# Patient Record
Sex: MALE | Race: OTHER | URBAN - METROPOLITAN AREA
[De-identification: names, ages, dates, MRNs, and addresses within clinical notes are randomized per-mention and may not be internally consistent; named-entity substitution may affect disease eponyms.]

---

## 2023-11-09 ENCOUNTER — EMERGENCY (EMERGENCY)
Facility: HOSPITAL | Age: 28
LOS: 1 days | Discharge: HOME | End: 2023-11-09
Admitting: EMERGENCY MEDICINE
Payer: COMMERCIAL

## 2023-11-09 VITALS
HEART RATE: 60 BPM | RESPIRATION RATE: 17 BRPM | SYSTOLIC BLOOD PRESSURE: 119 MMHG | OXYGEN SATURATION: 99 % | DIASTOLIC BLOOD PRESSURE: 74 MMHG | TEMPERATURE: 99 F

## 2023-11-09 PROCEDURE — 99283 EMERGENCY DEPT VISIT LOW MDM: CPT

## 2023-11-09 NOTE — ED PROVIDER NOTE - PATIENT PORTAL LINK FT
You can access the FollowMyHealth Patient Portal offered by [facility] by registering at the following website: [email or web address]. By joining [employer]’s FollowMyHealth portal, you will also be able to view your health information using other applications (apps) compatible with our system.

## 2023-11-09 NOTE — ED ADULT NURSE NOTE - PATIENT/CAREGIVER OFFERED  INTERPRETER SERVICES
[id number] PAUL DAVILA, ATTENDING NOTE:      GEN: Patient is awake and alert and in no acute distress.  Non-verbal at exam.  HEENT: Normocephalic/atraumatic.  Airway patent.  No oropharyngeal edema.  No stridor.  Auricles are normal.  Neck supple. Vertical surgical scar over the skull base and cervical spine.  Sclera are non-icteric/Conjunctiva are not injected.   RESP: Lungs CTAB, no wheeze, no rhonchi,  no rales.    CV: Heart is regular rate and rhythm.    ABD: Abdomen is soft, not distended +BS.  Non-tender to palpation.  No rebound/no guarding.  MSK: Back is nontender, no CVAT.   NEURO: Does not follow commands.   PSYCH: Flat affect.  No apparent risk to self or others.

## 2023-11-09 NOTE — ED PROVIDER NOTE - PHYSICAL EXAMINATION
tooth #4 avulsed to the gum line, + cares, + surrounding edema and erythema that extend to the hard palate.

## 2023-11-09 NOTE — ED PROVIDER NOTE - CARE PROVIDER_API CALL
[facility],   [facility] is located at [facility] [address] [place] [address] ([facility]) in [place].    Urgent care for patients pain, excessive bleeding, swelling, oral infection and/or trauma is provided with no appointment necessary, on a first-come, first-served basis, during the following times:    Monday – Thursday: 8:30 am – 4:00 pm  Friday, 8:30 am – 3:00 pm  Phone: (   )    -  Fax: (   )    -  Follow Up Time: Urgent

## 2023-11-09 NOTE — ED PROVIDER NOTE - PROVIDER TOKENS
FREE:[LAST:[[facility]],PHONE:[(   )    -],FAX:[(   )    -],ADDRESS:[[facility] is located at [facility] [address] [place] [address] ([facility]) in [place].    Urgent care for patients pain, excessive bleeding, swelling, oral infection and/or trauma is provided with no appointment necessary, on a first-come, first-served basis, during the following times:    Monday – Thursday: 8:30 am – 4:00 pm  Friday, 8:30 am – 3:00 pm],FOLLOWUP:[Urgent]]

## 2023-11-09 NOTE — ED PROVIDER NOTE - OBJECTIVE STATEMENT
Patient presents with tooth pain x 2 weeks with worsening pain and swelling over the past 3-4 days. denies fever, chills, nightsweats. state that he had broken his tooth a few months ago, did not have time to seek medical attention at the time, recently relocated to [place] and does not have a dentist.

## 2023-11-09 NOTE — ED PROVIDER NOTE - CLINICAL SUMMARY MEDICAL DECISION MAKING FREE TEXT BOX
Patient presents with worsening pain and swelling around tooth #4 from old fracture fracture. rx printed for augmentin. advised urgent follow up with [facility] for extraction and definitve care of dental infection.

## 2023-11-09 NOTE — ED ADULT TRIAGE NOTE - PATIENT/CAREGIVER OFFERED  INTERPRETER SERVICES
[id number] OSIRIS ALARCON:  CT with following impression: "Left lower quadrant anterior abdominal wall skin defect with an underlying small amorphous fluid collection raising concern for a phlegmon."  Case, lab results and CT results discussed with DR. Jeison Arce who recommends discharge, dressing changes and outpatient follow up with Dr. Dejesus within 1 week.  No antibiotics were recommended at this time.  Pt medically stable for discharge.  Strict return precautions given. Reassessment performed and plan for discharge discussed with Dr. Cordova who agrees with disposition and discharge plan. OSIRIS ALARCON:  CT with following impression: "Left lower quadrant anterior abdominal wall skin defect with an underlying small amorphous fluid collection raising concern for a phlegmon."  Case, lab results and CT results discussed with DR. Jeison Arce who recommends discharge, dressing changes and outpatient follow up with Dr. Dejesus within 1 week.  No antibiotics were recommended at this time.  Pt medically stable for discharge.  Strict return precautions given. Reassessment performed and plan for discharge discussed with Dr. Cordova who agrees with disposition and discharge plan.   ID [id number] used for discharge and reassessment

## 2023-11-12 DIAGNOSIS — K08.89 OTHER SPECIFIED DISORDERS OF TEETH AND SUPPORTING STRUCTURES: ICD-10-CM

## 2023-11-12 DIAGNOSIS — K04.7 PERIAPICAL ABSCESS WITHOUT SINUS: ICD-10-CM
